# Patient Record
Sex: MALE | Race: ASIAN | NOT HISPANIC OR LATINO | URBAN - METROPOLITAN AREA
[De-identification: names, ages, dates, MRNs, and addresses within clinical notes are randomized per-mention and may not be internally consistent; named-entity substitution may affect disease eponyms.]

---

## 2017-03-05 ENCOUNTER — EMERGENCY (EMERGENCY)
Facility: HOSPITAL | Age: 27
LOS: 1 days | Discharge: HOME | End: 2017-03-05
Attending: EMERGENCY MEDICINE | Admitting: EMERGENCY MEDICINE
Payer: COMMERCIAL

## 2017-03-05 VITALS
DIASTOLIC BLOOD PRESSURE: 104 MMHG | OXYGEN SATURATION: 100 % | RESPIRATION RATE: 18 BRPM | TEMPERATURE: 98 F | HEART RATE: 88 BPM | SYSTOLIC BLOOD PRESSURE: 158 MMHG

## 2017-03-05 LAB
ALBUMIN SERPL ELPH-MCNC: 4.6 G/DL — SIGNIFICANT CHANGE UP (ref 3.3–5)
ALP SERPL-CCNC: 60 U/L — SIGNIFICANT CHANGE UP (ref 40–120)
ALT FLD-CCNC: 97 U/L — HIGH (ref 4–41)
APTT BLD: 32.2 SEC — SIGNIFICANT CHANGE UP (ref 27.5–37.4)
AST SERPL-CCNC: 53 U/L — HIGH (ref 4–40)
BILIRUB SERPL-MCNC: 0.4 MG/DL — SIGNIFICANT CHANGE UP (ref 0.2–1.2)
BUN SERPL-MCNC: 13 MG/DL — SIGNIFICANT CHANGE UP (ref 7–23)
CALCIUM SERPL-MCNC: 9.2 MG/DL — SIGNIFICANT CHANGE UP (ref 8.4–10.5)
CHLORIDE SERPL-SCNC: 102 MMOL/L — SIGNIFICANT CHANGE UP (ref 98–107)
CK MB BLD-MCNC: 1.1 — SIGNIFICANT CHANGE UP (ref 0–2.5)
CK MB BLD-MCNC: 2.54 NG/ML — SIGNIFICANT CHANGE UP (ref 1–6.6)
CK SERPL-CCNC: 228 U/L — HIGH (ref 30–200)
CO2 SERPL-SCNC: 25 MMOL/L — SIGNIFICANT CHANGE UP (ref 22–31)
CREAT SERPL-MCNC: 0.86 MG/DL — SIGNIFICANT CHANGE UP (ref 0.5–1.3)
GLUCOSE SERPL-MCNC: 142 MG/DL — HIGH (ref 70–99)
HCT VFR BLD CALC: 42.7 % — SIGNIFICANT CHANGE UP (ref 39–50)
HGB BLD-MCNC: 15.1 G/DL — SIGNIFICANT CHANGE UP (ref 13–17)
INR BLD: 0.96 — SIGNIFICANT CHANGE UP (ref 0.87–1.18)
MCHC RBC-ENTMCNC: 29.7 PG — SIGNIFICANT CHANGE UP (ref 27–34)
MCHC RBC-ENTMCNC: 35.4 % — SIGNIFICANT CHANGE UP (ref 32–36)
MCV RBC AUTO: 83.9 FL — SIGNIFICANT CHANGE UP (ref 80–100)
PLATELET # BLD AUTO: 222 K/UL — SIGNIFICANT CHANGE UP (ref 150–400)
PMV BLD: 12.2 FL — SIGNIFICANT CHANGE UP (ref 7–13)
POTASSIUM SERPL-MCNC: 4.1 MMOL/L — SIGNIFICANT CHANGE UP (ref 3.5–5.3)
POTASSIUM SERPL-SCNC: 4.1 MMOL/L — SIGNIFICANT CHANGE UP (ref 3.5–5.3)
PROT SERPL-MCNC: 7.6 G/DL — SIGNIFICANT CHANGE UP (ref 6–8.3)
PROTHROM AB SERPL-ACNC: 10.9 SEC — SIGNIFICANT CHANGE UP (ref 10–13.1)
RBC # BLD: 5.09 M/UL — SIGNIFICANT CHANGE UP (ref 4.2–5.8)
RBC # FLD: 13.2 % — SIGNIFICANT CHANGE UP (ref 10.3–14.5)
SODIUM SERPL-SCNC: 142 MMOL/L — SIGNIFICANT CHANGE UP (ref 135–145)
TROPONIN T SERPL-MCNC: < 0.06 NG/ML — SIGNIFICANT CHANGE UP (ref 0–0.06)
WBC # BLD: 7.97 K/UL — SIGNIFICANT CHANGE UP (ref 3.8–10.5)
WBC # FLD AUTO: 7.97 K/UL — SIGNIFICANT CHANGE UP (ref 3.8–10.5)

## 2017-03-05 PROCEDURE — 71020: CPT | Mod: 26

## 2017-03-05 PROCEDURE — 99220: CPT | Mod: 25

## 2017-03-05 PROCEDURE — 93010 ELECTROCARDIOGRAM REPORT: CPT | Mod: 59

## 2017-03-05 RX ORDER — IBUPROFEN 200 MG
600 TABLET ORAL ONCE
Qty: 0 | Refills: 0 | Status: COMPLETED | OUTPATIENT
Start: 2017-03-05 | End: 2017-03-05

## 2017-03-05 RX ADMIN — Medication 600 MILLIGRAM(S): at 23:00

## 2017-03-05 RX ADMIN — Medication 600 MILLIGRAM(S): at 22:02

## 2017-03-05 NOTE — ED PROVIDER NOTE - ATTENDING CONTRIBUTION TO CARE
Donovan Note:  This is a 27 year old male w HTN, fatty liver, hypercholesterolemia, DM, and hypothyroidism who presents to the ED w a complaint of palpitations x one week and heaviness in his chest intermittently for last 2 days.  Yesterday he felt sob and like an elephant was sitting on his chest.  He had an exercise stress test 2 months ago which was negative.  Pt is concerned because his symptoms have been persistent.  Pt lungs cta b/l abd nt/nd neuro nonfocal exam .  Pt sent to [facility]du for nuclear stress test because of risk factors.

## 2017-03-05 NOTE — ED ADULT NURSE NOTE - OBJECTIVE STATEMENT
received to room 21. complains of intermittent chest pain for a week. states feels like a pressure on his chest, and "feels like im suffocating." pt denies any pain, sob, or other symptoms at this time. states had same symptoms months ago and had stress test. states his PMD "said it was nothing." respirations even and unlabored. appears comfortable at this time. pt states he is on medication for HTN and diabetes, but only takes his meds "when he has time." labs sent. iv access established. awaits results.

## 2017-03-05 NOTE — ED PROVIDER NOTE - OBJECTIVE STATEMENT
27M h/o HTN, DM, HLD, fatty liver, hypothyroid p/w CP/SOB.  Pt states today he felt palpitations chest heaviness and SOB.  Symptoms lasted about 2 hours, he says normally he does not get SOB.  Pt had EKG stress 2-3 months ago 2/2 pain but was told it was fine.  Denies abd pain n.v.d,.

## 2017-03-05 NOTE — ED ADULT TRIAGE NOTE - CHIEF COMPLAINT QUOTE
c/o generalized chest pain x 1 day....worse with inspiration.  denies cough/fevers.  c/o intermittent palps as well.  h/o DM, high cholesterol

## 2017-03-06 VITALS
RESPIRATION RATE: 14 BRPM | TEMPERATURE: 98 F | OXYGEN SATURATION: 100 % | SYSTOLIC BLOOD PRESSURE: 134 MMHG | DIASTOLIC BLOOD PRESSURE: 98 MMHG | HEART RATE: 93 BPM

## 2017-03-06 LAB
CK SERPL-CCNC: 177 U/L — SIGNIFICANT CHANGE UP (ref 30–200)
HBA1C BLD-MCNC: 8.6 % — HIGH (ref 4–5.6)
TROPONIN T SERPL-MCNC: < 0.06 NG/ML — SIGNIFICANT CHANGE UP (ref 0–0.06)

## 2017-03-06 PROCEDURE — 78452 HT MUSCLE IMAGE SPECT MULT: CPT | Mod: 26

## 2017-03-06 PROCEDURE — 93018 CV STRESS TEST I&R ONLY: CPT | Mod: GC

## 2017-03-06 PROCEDURE — 93016 CV STRESS TEST SUPVJ ONLY: CPT | Mod: GC

## 2017-03-06 PROCEDURE — 99217: CPT

## 2017-03-06 RX ORDER — METFORMIN HYDROCHLORIDE 850 MG/1
500 TABLET ORAL DAILY
Qty: 0 | Refills: 0 | Status: DISCONTINUED | OUTPATIENT
Start: 2017-03-06 | End: 2017-03-09

## 2017-03-06 RX ADMIN — METFORMIN HYDROCHLORIDE 500 MILLIGRAM(S): 850 TABLET ORAL at 10:45

## 2017-03-06 NOTE — ED [facility] PROVIDER NOTE - PLAN OF CARE
Follow up with your primary medical doctor within 48-72 hours with copies of your reports given to you to be reviewed. Take all of your medications as previously prescribed, it is important that you take your medicine as they are prescribed to you. Recommended follow up with a Cardiologist with in the week. If any worsening, new or concerning symptoms return to the ER.

## 2017-03-06 NOTE — ED CDU PROVIDER NOTE - OBJECTIVE STATEMENT
26 y/o male with h/o DM, HTN, HLD, hypothyroid presents to ER c/o chest pain x 1 week that got worst on Saturday with heart racing. Pt states pain radiated to the back and it was reproducible. On Sunday he started having SOB, therefore came to ER. states he had  exercise stress test 2-3 months ago. Pt sent to CDU for tele, ce and nuclear stress. Pt denies nausea, vomiting, diaphoresis, fever, chills, recent travel or other complaint.

## 2017-03-06 NOTE — ED CDU PROVIDER NOTE - PROGRESS NOTE DETAILS
LACIE Pitts  Pt sleeping comfortable ce x 2 - Neg. Pt awaiting stress testing. Will continue to monitor. LACIE Pitts  Pt signed out to LACIE Villagran LACIE Villagran DC note. Stress test negative.  NSR on tele. Serial CE's neg. HGA1C 8.6.  Was 11.5 three months ago with PMD. Pt actively working on diet. Spent 30 minutes discussing diet changes, meal modifications, exercise regimens.  Also encouraged compliance with Metformin until HGA1C is below 6.5. Pt accompanied with family. All understand and agree. [facility] DC note Kala: Pt. seen and examined, reviewed PA notes, agree with above. Pt. NAD at time of DC, s/p neg CE, stresss test and no dynamic changes on EKG. Will FU with PMD, educated to diet and meds for DM.

## 2017-03-06 NOTE — ED CDU PROVIDER NOTE - ATTENDING CONTRIBUTION TO CARE
27 year old male w DM, HTN, HLd, hypthyroidism presents to the Er w chest pain x 1 week that got worse 2 days ago.  Pt had neg stress test 2-3 months ago which was an exercise stress test.  + sob.  Given medical hx send to [facility] for nuclear stress test.  PE lungs cta b/l abd nt/nd neuro nonfocal exam Impression Chest Pain
